# Patient Record
Sex: FEMALE | Race: WHITE | NOT HISPANIC OR LATINO
[De-identification: names, ages, dates, MRNs, and addresses within clinical notes are randomized per-mention and may not be internally consistent; named-entity substitution may affect disease eponyms.]

---

## 2023-01-24 ENCOUNTER — APPOINTMENT (OUTPATIENT)
Dept: ANTEPARTUM | Facility: CLINIC | Age: 33
End: 2023-01-24
Payer: MEDICAID

## 2023-01-24 ENCOUNTER — ASOB RESULT (OUTPATIENT)
Age: 33
End: 2023-01-24

## 2023-01-24 PROCEDURE — 76811 OB US DETAILED SNGL FETUS: CPT

## 2023-04-19 ENCOUNTER — APPOINTMENT (OUTPATIENT)
Dept: ANTEPARTUM | Facility: CLINIC | Age: 33
End: 2023-04-19
Payer: MEDICAID

## 2023-04-19 ENCOUNTER — ASOB RESULT (OUTPATIENT)
Age: 33
End: 2023-04-19

## 2023-04-19 PROCEDURE — 76816 OB US FOLLOW-UP PER FETUS: CPT

## 2023-04-19 PROCEDURE — 76819 FETAL BIOPHYS PROFIL W/O NST: CPT | Mod: 59

## 2023-04-19 PROCEDURE — 99203 OFFICE O/P NEW LOW 30 MIN: CPT | Mod: 25

## 2023-04-25 ENCOUNTER — APPOINTMENT (OUTPATIENT)
Dept: MATERNAL FETAL MEDICINE | Facility: CLINIC | Age: 33
End: 2023-04-25

## 2023-05-10 ENCOUNTER — ASOB RESULT (OUTPATIENT)
Age: 33
End: 2023-05-10

## 2023-05-10 ENCOUNTER — APPOINTMENT (OUTPATIENT)
Dept: ANTEPARTUM | Facility: CLINIC | Age: 33
End: 2023-05-10
Payer: MEDICAID

## 2023-05-10 PROCEDURE — 76819 FETAL BIOPHYS PROFIL W/O NST: CPT | Mod: 59

## 2023-05-10 PROCEDURE — 99212 OFFICE O/P EST SF 10 MIN: CPT | Mod: 25

## 2023-05-10 PROCEDURE — 76816 OB US FOLLOW-UP PER FETUS: CPT

## 2023-05-24 ENCOUNTER — ASOB RESULT (OUTPATIENT)
Age: 33
End: 2023-05-24

## 2023-05-24 ENCOUNTER — APPOINTMENT (OUTPATIENT)
Dept: ANTEPARTUM | Facility: CLINIC | Age: 33
End: 2023-05-24
Payer: MEDICAID

## 2023-05-24 PROCEDURE — 76816 OB US FOLLOW-UP PER FETUS: CPT

## 2023-05-24 PROCEDURE — 76819 FETAL BIOPHYS PROFIL W/O NST: CPT | Mod: 59

## 2023-05-30 ENCOUNTER — OUTPATIENT (OUTPATIENT)
Dept: OUTPATIENT SERVICES | Facility: HOSPITAL | Age: 33
LOS: 1 days | End: 2023-05-30
Payer: MEDICAID

## 2023-05-30 VITALS
OXYGEN SATURATION: 98 % | WEIGHT: 136.91 LBS | DIASTOLIC BLOOD PRESSURE: 61 MMHG | RESPIRATION RATE: 16 BRPM | HEART RATE: 92 BPM | HEIGHT: 56 IN | TEMPERATURE: 98 F | SYSTOLIC BLOOD PRESSURE: 121 MMHG

## 2023-05-30 DIAGNOSIS — K21.9 GASTRO-ESOPHAGEAL REFLUX DISEASE WITHOUT ESOPHAGITIS: ICD-10-CM

## 2023-05-30 DIAGNOSIS — Z64.1 PROBLEMS RELATED TO MULTIPARITY: ICD-10-CM

## 2023-05-30 DIAGNOSIS — Z98.891 HISTORY OF UTERINE SCAR FROM PREVIOUS SURGERY: Chronic | ICD-10-CM

## 2023-05-30 DIAGNOSIS — D64.9 ANEMIA, UNSPECIFIED: ICD-10-CM

## 2023-05-30 LAB
APPEARANCE UR: ABNORMAL
BILIRUB UR-MCNC: NEGATIVE — SIGNIFICANT CHANGE UP
COLOR SPEC: YELLOW — SIGNIFICANT CHANGE UP
DIFF PNL FLD: NEGATIVE — SIGNIFICANT CHANGE UP
GLUCOSE UR QL: NEGATIVE — SIGNIFICANT CHANGE UP
HCT VFR BLD CALC: 36.4 % — SIGNIFICANT CHANGE UP (ref 34.5–45)
HGB BLD-MCNC: 12 G/DL — SIGNIFICANT CHANGE UP (ref 11.5–15.5)
KETONES UR-MCNC: ABNORMAL
LEUKOCYTE ESTERASE UR-ACNC: ABNORMAL
MCHC RBC-ENTMCNC: 26.9 PG — LOW (ref 27–34)
MCHC RBC-ENTMCNC: 33 GM/DL — SIGNIFICANT CHANGE UP (ref 32–36)
MCV RBC AUTO: 81.6 FL — SIGNIFICANT CHANGE UP (ref 80–100)
NITRITE UR-MCNC: NEGATIVE — SIGNIFICANT CHANGE UP
NRBC # BLD: 0 /100 WBCS — SIGNIFICANT CHANGE UP (ref 0–0)
NRBC # FLD: 0 K/UL — SIGNIFICANT CHANGE UP (ref 0–0)
PH UR: 6.5 — SIGNIFICANT CHANGE UP (ref 5–8)
PLATELET # BLD AUTO: 229 K/UL — SIGNIFICANT CHANGE UP (ref 150–400)
PROT UR-MCNC: ABNORMAL
RBC # BLD: 4.46 M/UL — SIGNIFICANT CHANGE UP (ref 3.8–5.2)
RBC # FLD: 16.2 % — HIGH (ref 10.3–14.5)
SP GR SPEC: 1.02 — SIGNIFICANT CHANGE UP (ref 1.01–1.05)
UROBILINOGEN FLD QL: ABNORMAL
WBC # BLD: 10.32 K/UL — SIGNIFICANT CHANGE UP (ref 3.8–10.5)
WBC # FLD AUTO: 10.32 K/UL — SIGNIFICANT CHANGE UP (ref 3.8–10.5)

## 2023-05-30 PROCEDURE — 86077 PHYS BLOOD BANK SERV XMATCH: CPT

## 2023-05-30 NOTE — OB PST NOTE - HISTORY OF PRESENT ILLNESS
This is a  32 y.o. female who appears pregnant , in no apparent distress. Scheduled  worksheet provided and signed by Dr Hazel is on the chart . Pt with problems related to multiparity , scheduled for repeat  section , with bilateral tubal sterilization . , pt offers no complaints in pst , reports good fetal movement today . Last sono 1 week ago .

## 2023-05-30 NOTE — OB PST NOTE - NSHPPHYSICALEXAM_GEN_ALL_CORE
Pst  Constitutional: Well Developed, Well Groomed, Well Nourished, No Distress    Eyes: PERRL, EOMI, conjunctiva clear    Ears: Normal    Mouth & Gums: Normal, moist    Pharynx: No tenderness, discharge, or peritonsillar abscess    Tonsils: No Redness, discharge, tenderness, or swelling    Neck: Supple, no JVD, normal thyroid glands, no carotid bruits, no cervical vertebral or paraspinal tenderness    Breast: Normal shape, no masses, no tenderness, nipples normal, no nipple discharge    Back: Normal shape, ROM intact, strength intact, no vertebral tenderness    Respiratory: Airway patent, breath sounds equal, good air movement, respiration non-labored, clear to auscultation bilateral, no chest wall tenderness, no intercostal retractions, no rales, no wheezes, no rhonchi, no subcutaneous emphysema    Cardiovascular:  Regular rate and rhythm, no rubs or murmur, normal PMI    Gastrointestinal: Soft, non-tender, non distention, no masses palpable, bowel sound normal, no bruit, no rebound tenderness    Extremities: No clubbing, cyanosis, or pedal edema    Vascular:  Carotid Pulse normal , Radial Pulse normal, Femoral Pulse normal, DP pulse normal, PT pulse normal    Neurological: alert & oriented x 3, sensation intact, deep reflexes intact, cranial nerve intact, normal strength    Skin: warm and dry, normal color    Lymph Nodes: normal posterior cervical lymph node, normal anterior cervical lymph node, normal supraclavicular lymph node    Musculoskeletal: ROM intact, no joint swelling, warmth, or calf tenderness. Normal strength    Psychiatric: normal affect, normal behavior

## 2023-05-30 NOTE — OB PST NOTE - PROBLEM SELECTOR PLAN 2
pt is npo from 12 midnight the night before surgery . Pt instructed to speak with Dr Ojeda regarding pepcid day of surgery .

## 2023-05-30 NOTE — OB PST NOTE - NSHPREVIEWOFSYSTEMS_GEN_ALL_CORE
Pst  General: No fever, chills, sweating, anorexia, weight loss or weight gain. No polyphagia, polyuria, polydipsia, malaise, or fatigue    Skin: No rashes, itching, or dryness. No change in size/color of moles. No tumors, brittle nails, pitted nails, or hair loss    Breast: No tenderness, lumps, or nipple discharge      Ophthalmologic: No diplopia, photophobia, lacrimation, blurred Vision , or eye discharge    ENMT Symptoms: No hearing difficulty, ear pain, tinnitus, or vertigo. No sinus symptoms, nasal congestion, nasal   discharge, or nasal obstruction    Respiratory and Thorax: dry cough , no fever , no chills , No wheezing, dyspnea, hemoptysis, or pleuritic chest Pain     Cardiovascular: No chest pain, palpitations, dyspnea on exertion, orthopnea, paroxysmal nocturnal dyspnea,   peripheral edema, or claudication    Gastrointestinal: Intermittent vomiting related to GERD , last 5/28/23 . No nausea, diarrhea, constipation, change in bowel habits, flatulence, abdominal pain, or melena    Genitourinary/ Pelvis: No hematuria, renal colic, or flank pain.  No urine discoloration, incontinence, dysuria, or urinary hesitancy. Normal urinary frequency. No nocturia, abnormal vaginal bleeding, vaginal discharge, spotting, pelvic pain, or vaginal leakage    Musculoskeletal: No arthralgia, arthritis, joint swelling, muscle cramping, muscle weakness, neck pain, arm pain, or leg pain    Neurological: No transient paralysis, weakness, paresthesias, or seizures. No syncope, tremors, vertigo, loss of sensation, difficulty walking, loss of consciousness, hemiparesis, confusion, or facial palsy    Psychiatric: No suicidal ideation, depression, anxiety, insomnia, memory loss, paranoia, mood swings, agitation, hallucinations, or hyperactivity    Hematology: No gum bleeding, nose bleeding, or skin lumps    Lymphatic: No enlarged or tender lymph nodes. No extremity swelling    Endocrine: No heat or cold intolerance    Immunologic: No recurrent or persistent infections

## 2023-05-30 NOTE — OB PST NOTE - PROBLEM SELECTOR PLAN 1
Scheduled for repeat  section with bilateral tubal sterilization   Preop instructions provided and patient verbalizes understanding.  Labs done and results pending. Hibiclens provided with instructions and was signed by patient. Teach-back method was utilized to assess patient's understanding. Patient verbalized understanding.

## 2023-05-31 ENCOUNTER — ASOB RESULT (OUTPATIENT)
Age: 33
End: 2023-05-31

## 2023-05-31 ENCOUNTER — APPOINTMENT (OUTPATIENT)
Dept: ANTEPARTUM | Facility: CLINIC | Age: 33
End: 2023-05-31
Payer: MEDICAID

## 2023-05-31 PROCEDURE — 76818 FETAL BIOPHYS PROFILE W/NST: CPT

## 2023-06-05 ENCOUNTER — TRANSCRIPTION ENCOUNTER (OUTPATIENT)
Age: 33
End: 2023-06-05

## 2023-06-06 ENCOUNTER — INPATIENT (INPATIENT)
Facility: HOSPITAL | Age: 33
LOS: 2 days | Discharge: HOME | End: 2023-06-09
Attending: SPECIALIST | Admitting: SPECIALIST
Payer: MEDICAID

## 2023-06-06 VITALS — SYSTOLIC BLOOD PRESSURE: 105 MMHG | HEART RATE: 80 BPM | DIASTOLIC BLOOD PRESSURE: 72 MMHG

## 2023-06-06 DIAGNOSIS — Z98.891 HISTORY OF UTERINE SCAR FROM PREVIOUS SURGERY: Chronic | ICD-10-CM

## 2023-06-06 DIAGNOSIS — Z64.1 PROBLEMS RELATED TO MULTIPARITY: ICD-10-CM

## 2023-06-06 LAB
BLD GP AB SCN SERPL QL: POSITIVE — SIGNIFICANT CHANGE UP
HBV SURFACE AG SERPL QL IA: SIGNIFICANT CHANGE UP
HCT VFR BLD CALC: 35.2 % — SIGNIFICANT CHANGE UP (ref 34.5–45)
HCT VFR BLD CALC: 38.4 % — SIGNIFICANT CHANGE UP (ref 34.5–45)
HGB BLD-MCNC: 11.6 G/DL — SIGNIFICANT CHANGE UP (ref 11.5–15.5)
HGB BLD-MCNC: 12.6 G/DL — SIGNIFICANT CHANGE UP (ref 11.5–15.5)
HIV 1+2 AB+HIV1 P24 AG SERPL QL IA: SIGNIFICANT CHANGE UP
MCHC RBC-ENTMCNC: 27.5 PG — SIGNIFICANT CHANGE UP (ref 27–34)
MCHC RBC-ENTMCNC: 27.9 PG — SIGNIFICANT CHANGE UP (ref 27–34)
MCHC RBC-ENTMCNC: 32.8 GM/DL — SIGNIFICANT CHANGE UP (ref 32–36)
MCHC RBC-ENTMCNC: 33 GM/DL — SIGNIFICANT CHANGE UP (ref 32–36)
MCV RBC AUTO: 83.4 FL — SIGNIFICANT CHANGE UP (ref 80–100)
MCV RBC AUTO: 85.1 FL — SIGNIFICANT CHANGE UP (ref 80–100)
NRBC # BLD: 0 /100 WBCS — SIGNIFICANT CHANGE UP (ref 0–0)
NRBC # BLD: 0 /100 WBCS — SIGNIFICANT CHANGE UP (ref 0–0)
NRBC # FLD: 0 K/UL — SIGNIFICANT CHANGE UP (ref 0–0)
NRBC # FLD: 0 K/UL — SIGNIFICANT CHANGE UP (ref 0–0)
PLATELET # BLD AUTO: 209 K/UL — SIGNIFICANT CHANGE UP (ref 150–400)
PLATELET # BLD AUTO: 217 K/UL — SIGNIFICANT CHANGE UP (ref 150–400)
RBC # BLD: 4.22 M/UL — SIGNIFICANT CHANGE UP (ref 3.8–5.2)
RBC # BLD: 4.51 M/UL — SIGNIFICANT CHANGE UP (ref 3.8–5.2)
RBC # FLD: 16.4 % — HIGH (ref 10.3–14.5)
RBC # FLD: 16.6 % — HIGH (ref 10.3–14.5)
RH IG SCN BLD-IMP: NEGATIVE — SIGNIFICANT CHANGE UP
RUBV IGG SER-ACNC: 3.9 INDEX — SIGNIFICANT CHANGE UP
RUBV IGG SER-IMP: POSITIVE — SIGNIFICANT CHANGE UP
T PALLIDUM AB TITR SER: NEGATIVE — SIGNIFICANT CHANGE UP
WBC # BLD: 18.58 K/UL — HIGH (ref 3.8–10.5)
WBC # BLD: 9.23 K/UL — SIGNIFICANT CHANGE UP (ref 3.8–10.5)
WBC # FLD AUTO: 18.58 K/UL — HIGH (ref 3.8–10.5)
WBC # FLD AUTO: 9.23 K/UL — SIGNIFICANT CHANGE UP (ref 3.8–10.5)

## 2023-06-06 PROCEDURE — 88302 TISSUE EXAM BY PATHOLOGIST: CPT | Mod: 26

## 2023-06-06 PROCEDURE — 86077 PHYS BLOOD BANK SERV XMATCH: CPT

## 2023-06-06 DEVICE — SEALANT VISTASEAL FIBRIN HUMAN 10ML 4/KIT: Type: IMPLANTABLE DEVICE | Status: FUNCTIONAL

## 2023-06-06 DEVICE — SURGICEL FIBRILLAR 1 X 2": Type: IMPLANTABLE DEVICE | Status: FUNCTIONAL

## 2023-06-06 RX ORDER — KETOROLAC TROMETHAMINE 30 MG/ML
30 SYRINGE (ML) INJECTION EVERY 6 HOURS
Refills: 0 | Status: DISCONTINUED | OUTPATIENT
Start: 2023-06-06 | End: 2023-06-07

## 2023-06-06 RX ORDER — FAMOTIDINE 10 MG/ML
20 INJECTION INTRAVENOUS ONCE
Refills: 0 | Status: COMPLETED | OUTPATIENT
Start: 2023-06-06 | End: 2023-06-06

## 2023-06-06 RX ORDER — DEXAMETHASONE 0.5 MG/5ML
4 ELIXIR ORAL EVERY 6 HOURS
Refills: 0 | Status: DISCONTINUED | OUTPATIENT
Start: 2023-06-06 | End: 2023-06-09

## 2023-06-06 RX ORDER — SODIUM CHLORIDE 9 MG/ML
1000 INJECTION, SOLUTION INTRAVENOUS
Refills: 0 | Status: DISCONTINUED | OUTPATIENT
Start: 2023-06-06 | End: 2023-06-06

## 2023-06-06 RX ORDER — SODIUM CHLORIDE 9 MG/ML
1000 INJECTION, SOLUTION INTRAVENOUS
Refills: 0 | Status: DISCONTINUED | OUTPATIENT
Start: 2023-06-06 | End: 2023-06-09

## 2023-06-06 RX ORDER — OXYTOCIN 10 UNIT/ML
333.33 VIAL (ML) INJECTION
Qty: 20 | Refills: 0 | Status: DISCONTINUED | OUTPATIENT
Start: 2023-06-06 | End: 2023-06-09

## 2023-06-06 RX ORDER — ONDANSETRON 8 MG/1
4 TABLET, FILM COATED ORAL EVERY 6 HOURS
Refills: 0 | Status: DISCONTINUED | OUTPATIENT
Start: 2023-06-06 | End: 2023-06-09

## 2023-06-06 RX ORDER — OXYTOCIN 10 UNIT/ML
333.33 VIAL (ML) INJECTION
Qty: 20 | Refills: 0 | Status: DISCONTINUED | OUTPATIENT
Start: 2023-06-06 | End: 2023-06-06

## 2023-06-06 RX ORDER — SODIUM CHLORIDE 9 MG/ML
500 INJECTION, SOLUTION INTRAVENOUS ONCE
Refills: 0 | Status: DISCONTINUED | OUTPATIENT
Start: 2023-06-06 | End: 2023-06-09

## 2023-06-06 RX ORDER — OXYCODONE HYDROCHLORIDE 5 MG/1
5 TABLET ORAL
Refills: 0 | Status: COMPLETED | OUTPATIENT
Start: 2023-06-06 | End: 2023-06-13

## 2023-06-06 RX ORDER — SODIUM CHLORIDE 9 MG/ML
1000 INJECTION, SOLUTION INTRAVENOUS ONCE
Refills: 0 | Status: DISCONTINUED | OUTPATIENT
Start: 2023-06-06 | End: 2023-06-09

## 2023-06-06 RX ORDER — IBUPROFEN 200 MG
600 TABLET ORAL EVERY 6 HOURS
Refills: 0 | Status: COMPLETED | OUTPATIENT
Start: 2023-06-06 | End: 2024-05-04

## 2023-06-06 RX ORDER — NALOXONE HYDROCHLORIDE 4 MG/.1ML
0.1 SPRAY NASAL
Refills: 0 | Status: DISCONTINUED | OUTPATIENT
Start: 2023-06-06 | End: 2023-06-09

## 2023-06-06 RX ORDER — CITRIC ACID/SODIUM CITRATE 300-500 MG
30 SOLUTION, ORAL ORAL ONCE
Refills: 0 | Status: COMPLETED | OUTPATIENT
Start: 2023-06-06 | End: 2023-06-06

## 2023-06-06 RX ORDER — NALBUPHINE HYDROCHLORIDE 10 MG/ML
2.5 INJECTION, SOLUTION INTRAMUSCULAR; INTRAVENOUS; SUBCUTANEOUS EVERY 6 HOURS
Refills: 0 | Status: DISCONTINUED | OUTPATIENT
Start: 2023-06-06 | End: 2023-06-09

## 2023-06-06 RX ORDER — OXYCODONE HYDROCHLORIDE 5 MG/1
5 TABLET ORAL ONCE
Refills: 0 | Status: DISCONTINUED | OUTPATIENT
Start: 2023-06-06 | End: 2023-06-09

## 2023-06-06 RX ORDER — DIPHENHYDRAMINE HCL 50 MG
25 CAPSULE ORAL EVERY 6 HOURS
Refills: 0 | Status: DISCONTINUED | OUTPATIENT
Start: 2023-06-06 | End: 2023-06-09

## 2023-06-06 RX ORDER — SIMETHICONE 80 MG/1
80 TABLET, CHEWABLE ORAL EVERY 4 HOURS
Refills: 0 | Status: DISCONTINUED | OUTPATIENT
Start: 2023-06-06 | End: 2023-06-09

## 2023-06-06 RX ORDER — HEPARIN SODIUM 5000 [USP'U]/ML
5000 INJECTION INTRAVENOUS; SUBCUTANEOUS EVERY 12 HOURS
Refills: 0 | Status: DISCONTINUED | OUTPATIENT
Start: 2023-06-06 | End: 2023-06-09

## 2023-06-06 RX ORDER — SODIUM CHLORIDE 9 MG/ML
1000 INJECTION, SOLUTION INTRAVENOUS ONCE
Refills: 0 | Status: COMPLETED | OUTPATIENT
Start: 2023-06-06 | End: 2023-06-06

## 2023-06-06 RX ORDER — LANOLIN
1 OINTMENT (GRAM) TOPICAL EVERY 6 HOURS
Refills: 0 | Status: DISCONTINUED | OUTPATIENT
Start: 2023-06-06 | End: 2023-06-09

## 2023-06-06 RX ORDER — SODIUM CHLORIDE 9 MG/ML
1000 INJECTION, SOLUTION INTRAVENOUS ONCE
Refills: 0 | Status: DISCONTINUED | OUTPATIENT
Start: 2023-06-06 | End: 2023-06-06

## 2023-06-06 RX ORDER — ACETAMINOPHEN 500 MG
975 TABLET ORAL
Refills: 0 | Status: DISCONTINUED | OUTPATIENT
Start: 2023-06-06 | End: 2023-06-09

## 2023-06-06 RX ORDER — TETANUS TOXOID, REDUCED DIPHTHERIA TOXOID AND ACELLULAR PERTUSSIS VACCINE, ADSORBED 5; 2.5; 8; 8; 2.5 [IU]/.5ML; [IU]/.5ML; UG/.5ML; UG/.5ML; UG/.5ML
0.5 SUSPENSION INTRAMUSCULAR ONCE
Refills: 0 | Status: DISCONTINUED | OUTPATIENT
Start: 2023-06-06 | End: 2023-06-09

## 2023-06-06 RX ORDER — MAGNESIUM HYDROXIDE 400 MG/1
30 TABLET, CHEWABLE ORAL
Refills: 0 | Status: DISCONTINUED | OUTPATIENT
Start: 2023-06-06 | End: 2023-06-09

## 2023-06-06 RX ORDER — MORPHINE SULFATE 50 MG/1
2 CAPSULE, EXTENDED RELEASE ORAL ONCE
Refills: 0 | Status: DISCONTINUED | OUTPATIENT
Start: 2023-06-06 | End: 2023-06-09

## 2023-06-06 RX ADMIN — Medication 975 MILLIGRAM(S): at 21:17

## 2023-06-06 RX ADMIN — SIMETHICONE 80 MILLIGRAM(S): 80 TABLET, CHEWABLE ORAL at 21:18

## 2023-06-06 RX ADMIN — Medication 30 MILLIGRAM(S): at 18:04

## 2023-06-06 RX ADMIN — Medication 1000 MILLIUNIT(S)/MIN: at 12:37

## 2023-06-06 RX ADMIN — HEPARIN SODIUM 5000 UNIT(S): 5000 INJECTION INTRAVENOUS; SUBCUTANEOUS at 16:52

## 2023-06-06 RX ADMIN — Medication 975 MILLIGRAM(S): at 16:45

## 2023-06-06 RX ADMIN — SODIUM CHLORIDE 200 MILLILITER(S): 9 INJECTION, SOLUTION INTRAVENOUS at 07:56

## 2023-06-06 RX ADMIN — Medication 30 MILLIGRAM(S): at 23:37

## 2023-06-06 RX ADMIN — Medication 30 MILLILITER(S): at 07:57

## 2023-06-06 RX ADMIN — Medication 30 MILLIGRAM(S): at 17:24

## 2023-06-06 RX ADMIN — SODIUM CHLORIDE 75 MILLILITER(S): 9 INJECTION, SOLUTION INTRAVENOUS at 12:37

## 2023-06-06 RX ADMIN — SODIUM CHLORIDE 1000 MILLILITER(S): 9 INJECTION, SOLUTION INTRAVENOUS at 11:20

## 2023-06-06 RX ADMIN — Medication 975 MILLIGRAM(S): at 22:00

## 2023-06-06 RX ADMIN — Medication 975 MILLIGRAM(S): at 15:26

## 2023-06-06 RX ADMIN — FAMOTIDINE 20 MILLIGRAM(S): 10 INJECTION INTRAVENOUS at 07:56

## 2023-06-06 NOTE — OB NEONATOLOGY/PEDIATRICIAN DELIVERY SUMMARY - NSPEDSNEONOTESA_OBGYN_ALL_OB_FT
Peds called to the delivery of a 39.0 wk male for vacuum. Baby born to a 33 y/o  mother via vacuum-assisted repeat CS. Maternal history significant for prior C/S, anxiety without meds. Pregnancy uncomplicated. Maternal blood type O neg. Prenatal labs negative, non-reactive and immune. GBS negative on . AROM at TOD, clear. Baby was born vigorous and crying spontaneously. W/D/S/S. APGARS 9/9. Highest maternal temp 36.6C; EOS: N/A. Mom would like to breastfeed, declines Hep B vaccine and circ.    Gen: NAD; well-appearing, significant vernix  HEENT: NC/AT; AFOF; ears and nose clinically patent, normally set; no tags; no cleft lip/palate, oropharynx clear, mucous membranes moist  Skin: pink, warm, well-perfused, no rash  Resp: CTAB, even, non-labored breathing  Cardiac: umbilical pulse regular  Abd: soft, NT/ND; +BS; no HSM, no masses palpated; umbilicus c/d/I, 3 vessels  Back: spine straight, sacral dimple with base  Extremities: FROM; no crepitus; negative O/B  : Usman I; no abnormalities; testes descended B/L; anus appears patent  Neuro: normal tone; +[place], suck, grasp, Babinski Peds called to the delivery of a 39.0 wk male for vacuum. Baby born to a 33 y/o  mother via vacuum-assisted repeat CS. Maternal history significant for prior C/S, anxiety without meds. Pregnancy uncomplicated. Maternal blood type O neg, rec'd rhogam at 28 weeks. Prenatal labs HIV negative, other labs sent and pending. GBS negative on . AROM at TOD, clear. Baby was born vigorous and crying spontaneously. W/D/S/S. APGARS 9/9. Highest maternal temp 36.6C; EOS: N/A. Mom would like to breastfeed, declines Hep B vaccine and circ.    Gen: NAD; well-appearing, significant vernix  HEENT: NC/AT; AFOF; ears and nose clinically patent, normally set; no tags; no cleft lip/palate, oropharynx clear, mucous membranes moist  Skin: pink, warm, well-perfused, no rash  Resp: CTAB, even, non-labored breathing  Cardiac: umbilical pulse regular  Abd: soft, NT/ND; +BS; no HSM, no masses palpated; umbilicus c/d/I, 3 vessels  Back: spine straight, sacral dimple with base  Extremities: FROM; no crepitus; negative O/B  : Usman I; no abnormalities; testes descended B/L; anus appears patent  Neuro: normal tone; +Mini, suck, grasp, Babinski

## 2023-06-06 NOTE — PROCEDURAL SAFETY CHECKLIST WITH OR WITHOUT SEDATION - NSPREPROCFT_GEN_ALL_CORE
repeat c/s, bilateral salpingectomy, possible bilateral tubal ligation, combined spinal epidural anesthesia

## 2023-06-06 NOTE — OB RN INTRAOPERATIVE NOTE - NSRNPREP_OBGYN_ALL_OB
Asc Procedure Text (A): After obtaining clear surgical margins the patient was sent to an ASC for surgical repair.  The patient understands they will receive post-surgical care and follow-up from the ASC physician. Yes

## 2023-06-06 NOTE — OB PROVIDER DELIVERY SUMMARY - NSSELHIDDEN_OBGYN_ALL_OB_FT
[NS_DeliveryAttending1_OBGYN_ALL_OB_FT:[id number]],[NS_DeliveryAssist1_OBGYN_ALL_OB_FT:[id number]],[NS_DeliveryAssist2_OBGYN_ALL_OB_FT:[id number]],[NS_DeliveryRN_OBGYN_ALL_OB_FT:[id number]]

## 2023-06-06 NOTE — OB PROVIDER H&P - NSHPPHYSICALEXAM_GEN_ALL_CORE
Vital Signs:  T(C): 36.6 (06 Jun 2023 06:33), Max: 36.6 (06 Jun 2023 06:33)  T(F): 97.88 (06 Jun 2023 06:33), Max: 97.88 (06 Jun 2023 06:33)  HR: 80 (06 Jun 2023 06:32) (80 - 80)  BP: 105/72 (06 Jun 2023 06:32) (105/72 - 105/72)  RR: 14 (06 Jun 2023 06:33) (14 - 14)  General: A&O x3  Heart: RRR, S1 & S2  Lungs: CTA b/l, good inspiratory/expiratory effort. No rhonchi, no rales  Abdomen: Soft, Gravid, TOCO in place, **+pfannenstial scar  Extremities: FROM, bilateral 1+ LE edema  Neuro: grossly intact

## 2023-06-06 NOTE — OB PROVIDER DELIVERY SUMMARY - NSOBVTERISKREFER_OBGYN_ALL_OB
It was a pleasure to see you today.     For the findings in your lungs we will do a bronchoscopy to get better samples. We will evaluate for inflammation and infections.    Please schedule lung function testing.    I will see you back with all the results        Fern Álvarez MD  Pulmonary and Critical Care Medicine  [facility]  Office: [phone]      
Refer to the Assessment tab to view/cancel completed assessment.

## 2023-06-06 NOTE — OB RN PATIENT PROFILE - ALERT: PERTINENT HISTORY
Patient desires tubal ligation/1st Trimester Sonogram/20 Week Level II Sonogram/Non Invasive Prenatal Screen (NIPS)/Fetal Non-Stress Test (NST)/Ultra Screen at 12 Weeks

## 2023-06-06 NOTE — OB RN DELIVERY SUMMARY - NSSELHIDDEN_OBGYN_ALL_OB_FT
[NS_DeliveryAttending1_OBGYN_ALL_OB_FT:[id number]],[NS_DeliveryAssist1_OBGYN_ALL_OB_FT:[email or web address]],[NS_DeliveryAssist2_OBGYN_ALL_OB_FT:[id number]],[NS_DeliveryRN_OBGYN_ALL_OB_FT:[id number]]

## 2023-06-06 NOTE — OB PROVIDER DELIVERY SUMMARY - NSPROVIDERDELIVERYNOTE_OBGYN_ALL_OB_FT
Repeat  section  Classical incision made 2/2 dense adhesions of rectus to uterine wall and limited visualization. Viable male infant, vertex presentation. Vacuum assisted delivery. Following delivery, uterus able to exteriorized. Hysterotomy closed in multiple layer. Bilateral salpingectomy performed. Vistaseal and fibrillar placed over hysterotomy. Grossly normal fallopian tubes, uterus, and ovaries    Antibiotics: Ancef    QBL: 1433   IVF: 2000  UOP:200    Bin PGY4  w/ Dr. Acuna NP    Dictation#[id number]

## 2023-06-06 NOTE — OB RN INTRAOPERATIVE NOTE - NSSELHIDDEN_OBGYN_ALL_OB_FT
[NS_DeliveryAttending1_OBGYN_ALL_OB_FT:[id number]],[NS_DeliveryAssist1_OBGYN_ALL_OB_FT:[email or web address]=],[NS_DeliveryAssist2_OBGYN_ALL_OB_FT:[id number]],[NS_DeliveryRN_OBGYN_ALL_OB_FT:[id number]]

## 2023-06-06 NOTE — OB RN INTRAOPERATIVE NOTE - NS_SKINPREP_OBGYN_ALL_OB
[Fever] : no fever [Chills] : no chills [Chest Pain] : no chest pain [Shortness Of Breath] : no shortness of breath [Abdominal Pain] : no abdominal pain Chloraprep [Vomiting] : no vomiting [Constipation] : no constipation [Diarrhea] : no diarrhea [Dizziness] : no dizziness

## 2023-06-06 NOTE — OB PROVIDER H&P - ASSESSMENT
A: 31 yo , EGA@39 weeks, scheduled for repeat  section and bilateral tubal ligation.  Plan: admit to PACU  - blood transfusion consent signed, operative consent is to be discussed and signed by Dr Maldonado.  - NPO  - admission labs sent  - NST  - 2 IV access, 2 Units PRBC on hold  - preop meds as per preop policy    Kami Powell CNM

## 2023-06-06 NOTE — OB PROVIDER H&P - HISTORY OF PRESENT ILLNESS
33 yo , EGA@39 weeks presented for scheduled repeat  section with bilateral tubal ligation.  Patient denies contractions, leaking fluid, vaginal bleeding, reports + fetal movements.  Prenatal care with carson Maldonado.  Prenatal course is uncomplicated.  History of 4  sections.

## 2023-06-06 NOTE — OB RN PATIENT PROFILE - AS SC BRADEN FRICTION
Left message to call the office.
Patient called asking if she could get an order for covid antibody test . Patient states that she just had a negative covid test last Friday done at a [facility] in [place] but she had been with her mother had tested positive for covid and she had had previous symptoms herself of fever , slight cough and mild diarrhea . Patient would like to know if she has had covid or not. Okay to order ?
Pt notified ok to order antibody test.  She will have done at [facility].
(3) no apparent problem

## 2023-06-06 NOTE — OB RN PATIENT PROFILE - NS_OBGYNHISTORY_OBGYN_ALL_OB_FT
2008 c/c [facility] F5#6 Post partum depression  2009 c/s F 6#15  2012 c/s F 6#13  2016 c/s F 6#14

## 2023-06-06 NOTE — OB PROVIDER H&P - NS_OBGYNHISTORY_OBGYN_ALL_OB_FT
OB hx: 3/29/2008,  section, full term, uncomplicated, [id number]           9/15/2009,  section, full term, uncomplicated, [id number]           2009,  section, full term, uncomplicated,[id number]           2016,  section, full term, uncomplicated, [id number]  GYN hx: denies hx of abnormal Papsmear/cysts/fibroids/STDs

## 2023-06-07 ENCOUNTER — APPOINTMENT (OUTPATIENT)
Dept: ANTEPARTUM | Facility: CLINIC | Age: 33
End: 2023-06-07

## 2023-06-07 LAB
BASOPHILS # BLD AUTO: 0.06 K/UL — SIGNIFICANT CHANGE UP (ref 0–0.2)
BASOPHILS NFR BLD AUTO: 0.4 % — SIGNIFICANT CHANGE UP (ref 0–2)
EOSINOPHIL # BLD AUTO: 0.05 K/UL — SIGNIFICANT CHANGE UP (ref 0–0.5)
EOSINOPHIL NFR BLD AUTO: 0.3 % — SIGNIFICANT CHANGE UP (ref 0–6)
HCT VFR BLD CALC: 27.2 % — LOW (ref 34.5–45)
HGB BLD-MCNC: 8.8 G/DL — LOW (ref 11.5–15.5)
IANC: 10.88 K/UL — HIGH (ref 1.8–7.4)
IMM GRANULOCYTES NFR BLD AUTO: 0.4 % — SIGNIFICANT CHANGE UP (ref 0–0.9)
KLEIHAUER-BETKE CALCULATION: 0 % — SIGNIFICANT CHANGE UP
LYMPHOCYTES # BLD AUTO: 15 % — SIGNIFICANT CHANGE UP (ref 13–44)
LYMPHOCYTES # BLD AUTO: 2.19 K/UL — SIGNIFICANT CHANGE UP (ref 1–3.3)
MCHC RBC-ENTMCNC: 27.9 PG — SIGNIFICANT CHANGE UP (ref 27–34)
MCHC RBC-ENTMCNC: 32.4 GM/DL — SIGNIFICANT CHANGE UP (ref 32–36)
MCV RBC AUTO: 86.3 FL — SIGNIFICANT CHANGE UP (ref 80–100)
MONOCYTES # BLD AUTO: 1.32 K/UL — HIGH (ref 0–0.9)
MONOCYTES NFR BLD AUTO: 9.1 % — SIGNIFICANT CHANGE UP (ref 2–14)
NEUTROPHILS # BLD AUTO: 10.88 K/UL — HIGH (ref 1.8–7.4)
NEUTROPHILS NFR BLD AUTO: 74.8 % — SIGNIFICANT CHANGE UP (ref 43–77)
NRBC # BLD: 0 /100 WBCS — SIGNIFICANT CHANGE UP (ref 0–0)
NRBC # FLD: 0 K/UL — SIGNIFICANT CHANGE UP (ref 0–0)
PLATELET # BLD AUTO: 185 K/UL — SIGNIFICANT CHANGE UP (ref 150–400)
RBC # BLD: 3.15 M/UL — LOW (ref 3.8–5.2)
RBC # FLD: 16.8 % — HIGH (ref 10.3–14.5)
WBC # BLD: 14.56 K/UL — HIGH (ref 3.8–10.5)
WBC # FLD AUTO: 14.56 K/UL — HIGH (ref 3.8–10.5)

## 2023-06-07 RX ORDER — FERROUS SULFATE 325(65) MG
325 TABLET ORAL DAILY
Refills: 0 | Status: DISCONTINUED | OUTPATIENT
Start: 2023-06-07 | End: 2023-06-09

## 2023-06-07 RX ORDER — ASCORBIC ACID 60 MG
500 TABLET,CHEWABLE ORAL
Refills: 0 | Status: DISCONTINUED | OUTPATIENT
Start: 2023-06-07 | End: 2023-06-09

## 2023-06-07 RX ORDER — IBUPROFEN 200 MG
600 TABLET ORAL EVERY 6 HOURS
Refills: 0 | Status: DISCONTINUED | OUTPATIENT
Start: 2023-06-07 | End: 2023-06-09

## 2023-06-07 RX ORDER — OXYCODONE HYDROCHLORIDE 5 MG/1
5 TABLET ORAL
Refills: 0 | Status: DISCONTINUED | OUTPATIENT
Start: 2023-06-07 | End: 2023-06-09

## 2023-06-07 RX ORDER — POLYETHYLENE GLYCOL 3350 17 G/17G
17 POWDER, FOR SOLUTION ORAL DAILY
Refills: 0 | Status: DISCONTINUED | OUTPATIENT
Start: 2023-06-07 | End: 2023-06-09

## 2023-06-07 RX ORDER — SENNA PLUS 8.6 MG/1
2 TABLET ORAL EVERY 12 HOURS
Refills: 0 | Status: DISCONTINUED | OUTPATIENT
Start: 2023-06-07 | End: 2023-06-09

## 2023-06-07 RX ADMIN — OXYCODONE HYDROCHLORIDE 5 MILLIGRAM(S): 5 TABLET ORAL at 02:42

## 2023-06-07 RX ADMIN — Medication 600 MILLIGRAM(S): at 17:56

## 2023-06-07 RX ADMIN — Medication 975 MILLIGRAM(S): at 03:00

## 2023-06-07 RX ADMIN — Medication 30 MILLIGRAM(S): at 05:38

## 2023-06-07 RX ADMIN — Medication 500 MILLIGRAM(S): at 11:02

## 2023-06-07 RX ADMIN — Medication 975 MILLIGRAM(S): at 14:30

## 2023-06-07 RX ADMIN — Medication 600 MILLIGRAM(S): at 11:02

## 2023-06-07 RX ADMIN — Medication 975 MILLIGRAM(S): at 08:17

## 2023-06-07 RX ADMIN — OXYCODONE HYDROCHLORIDE 5 MILLIGRAM(S): 5 TABLET ORAL at 03:15

## 2023-06-07 RX ADMIN — Medication 600 MILLIGRAM(S): at 11:35

## 2023-06-07 RX ADMIN — Medication 30 MILLIGRAM(S): at 01:04

## 2023-06-07 RX ADMIN — HEPARIN SODIUM 5000 UNIT(S): 5000 INJECTION INTRAVENOUS; SUBCUTANEOUS at 05:38

## 2023-06-07 RX ADMIN — SIMETHICONE 80 MILLIGRAM(S): 80 TABLET, CHEWABLE ORAL at 02:42

## 2023-06-07 RX ADMIN — OXYCODONE HYDROCHLORIDE 5 MILLIGRAM(S): 5 TABLET ORAL at 23:51

## 2023-06-07 RX ADMIN — Medication 30 MILLIGRAM(S): at 06:44

## 2023-06-07 RX ADMIN — Medication 600 MILLIGRAM(S): at 23:52

## 2023-06-07 RX ADMIN — SENNA PLUS 2 TABLET(S): 8.6 TABLET ORAL at 11:16

## 2023-06-07 RX ADMIN — Medication 975 MILLIGRAM(S): at 22:15

## 2023-06-07 RX ADMIN — Medication 975 MILLIGRAM(S): at 21:19

## 2023-06-07 RX ADMIN — Medication 975 MILLIGRAM(S): at 13:58

## 2023-06-07 RX ADMIN — Medication 600 MILLIGRAM(S): at 17:26

## 2023-06-07 RX ADMIN — Medication 975 MILLIGRAM(S): at 02:24

## 2023-06-07 RX ADMIN — Medication 975 MILLIGRAM(S): at 08:56

## 2023-06-07 NOTE — PROGRESS NOTE ADULT - SUBJECTIVE AND OBJECTIVE BOX
OB Progress Note:  Delivery, POD#1    S: Her pain is well controlled. She is tolerating a regular diet and passing flatus. Denies N/V. Denies CP/SOB/lightheadedness/dizziness. Endorses light vaginal bleeding, less than one pad per hour. She is ambulating without difficulty. Voiding spontaneously.     O:   Vital Signs Last 24 Hrs  T(C): 36.6 (2023 06:31), Max: 37 (2023 18:50)  T(F): 97.9 (2023 06:31), Max: 98.6 (2023 18:50)  HR: 84 (2023 06:31) (67 - 95)  BP: 105/61 (2023 06:31) (98/58 - 120/71)  BP(mean): 73 (2023 17:00) (66 - 88)  RR: 17 (2023 06:31) (12 - 25)  SpO2: 99% (2023 06:31) (98% - 100%)    Parameters below as of 2023 06:31  Patient On (Oxygen Delivery Method): room air        PE:  General: NAD  Heart: extremities well-perfused  Lungs: breathing comfortably  Abdomen: Mildly distended, appropriately tender, incision c/d/i.  Extremities: No erythema, no pitting edema    Labs:  Blood type: O Negative  Rubella IgG: RPR: Negative                          8.8<L>   14.56<H> >-----------< 185    (  @ 05:40 )             27.2<L>                        11.6   18.58<H> >-----------< 217    (  @ 14:23 )             35.2                        12.6   9.23 >-----------< 209    (  @ 06:40 )             38.4

## 2023-06-07 NOTE — PROGRESS NOTE ADULT - SUBJECTIVE AND OBJECTIVE BOX
Postop Day  1  s/p   C- Section    THERAPY:  [ X] Epidural morphine         Sedation Score:	  [ X] Alert	    [  ] Drowsy        [  ] Arousable	[  ] Asleep	[  ] Unresponsive    Side Effects:	  [ X] None	     [  ] Nausea        [  ] Pruritus        [  ] Weakness   [  ] Numbness      Denies headache, able to ambulate, tolerating diet, voiding without issues. Her only complaint is right-sided rib pain. I reassured her that the symptom should subside; however, encouraged her to contact our team should symptom persist.     ASSESSMENT/ PLAN   Change to po prn pain meds 24 hours post Spinal Morphine.  [ x ]Documentation and Verification of current medications

## 2023-06-08 RX ORDER — IBUPROFEN 200 MG
1 TABLET ORAL
Qty: 0 | Refills: 0 | DISCHARGE
Start: 2023-06-08

## 2023-06-08 RX ORDER — ACETAMINOPHEN 500 MG
3 TABLET ORAL
Qty: 0 | Refills: 0 | DISCHARGE
Start: 2023-06-08

## 2023-06-08 RX ORDER — FAMOTIDINE 10 MG/ML
1 INJECTION INTRAVENOUS
Refills: 0 | DISCHARGE

## 2023-06-08 RX ADMIN — Medication 600 MILLIGRAM(S): at 17:26

## 2023-06-08 RX ADMIN — OXYCODONE HYDROCHLORIDE 5 MILLIGRAM(S): 5 TABLET ORAL at 00:50

## 2023-06-08 RX ADMIN — OXYCODONE HYDROCHLORIDE 5 MILLIGRAM(S): 5 TABLET ORAL at 16:53

## 2023-06-08 RX ADMIN — Medication 600 MILLIGRAM(S): at 18:00

## 2023-06-08 RX ADMIN — Medication 600 MILLIGRAM(S): at 12:14

## 2023-06-08 RX ADMIN — Medication 600 MILLIGRAM(S): at 07:00

## 2023-06-08 RX ADMIN — OXYCODONE HYDROCHLORIDE 5 MILLIGRAM(S): 5 TABLET ORAL at 06:06

## 2023-06-08 RX ADMIN — OXYCODONE HYDROCHLORIDE 5 MILLIGRAM(S): 5 TABLET ORAL at 07:00

## 2023-06-08 RX ADMIN — Medication 600 MILLIGRAM(S): at 06:07

## 2023-06-08 RX ADMIN — Medication 500 MILLIGRAM(S): at 06:07

## 2023-06-08 RX ADMIN — Medication 600 MILLIGRAM(S): at 11:23

## 2023-06-08 RX ADMIN — Medication 500 MILLIGRAM(S): at 17:26

## 2023-06-08 RX ADMIN — Medication 975 MILLIGRAM(S): at 03:43

## 2023-06-08 RX ADMIN — Medication 600 MILLIGRAM(S): at 00:50

## 2023-06-08 RX ADMIN — Medication 975 MILLIGRAM(S): at 22:31

## 2023-06-08 RX ADMIN — OXYCODONE HYDROCHLORIDE 5 MILLIGRAM(S): 5 TABLET ORAL at 00:25

## 2023-06-08 RX ADMIN — OXYCODONE HYDROCHLORIDE 5 MILLIGRAM(S): 5 TABLET ORAL at 16:23

## 2023-06-08 RX ADMIN — Medication 975 MILLIGRAM(S): at 04:40

## 2023-06-08 RX ADMIN — SENNA PLUS 2 TABLET(S): 8.6 TABLET ORAL at 06:06

## 2023-06-08 RX ADMIN — OXYCODONE HYDROCHLORIDE 5 MILLIGRAM(S): 5 TABLET ORAL at 23:26

## 2023-06-08 RX ADMIN — SIMETHICONE 80 MILLIGRAM(S): 80 TABLET, CHEWABLE ORAL at 06:06

## 2023-06-08 RX ADMIN — Medication 975 MILLIGRAM(S): at 23:30

## 2023-06-08 NOTE — DISCHARGE NOTE OB - PLAN OF CARE
REGULAR DIET  AMBULATION ENCOURAGED After discharge, please stay on pelvic rest for 6 weeks, meaning no sexual intercourse, no tampons and no douching.  No driving for 2 weeks as women can loose a lot of blood during delivery and there is a possibility of being lightheaded/fainting.  No lifting objects heavier than baby for two weeks.  Expect to have vaginal bleeding/spotting for up to six weeks.  The bleeding should get lighter and more white/light brown with time.  For bleeding soaking more than a pad an hour or passing clots greater than the size of your fist, come in to the emergency department.    Follow up in OB office in 1-2 weeks for incision check.  Call for noticeable increase in redness or swelling at incision, discharge from incision, or opening of skin at incision site

## 2023-06-08 NOTE — PROGRESS NOTE ADULT - SUBJECTIVE AND OBJECTIVE BOX
Postpartum Note,  Section  She is a  32y woman who is now post-operative day: 2    Subjective:  The patient feels well.  Mild incisional pain  Passed flatus  She is ambulating.   She is tolerating regular diet.  She denies nausea and vomiting.  She is voiding.  Her pain is controlled.  She reports normal postpartum bleeding    Physical exam:    Vital Signs Last 24 Hrs  T(C): 36.6 (2023 06:44), Max: 36.7 (2023 21:19)  T(F): 97.8 (2023 06:44), Max: 98.1 (2023 21:19)  HR: 99 (2023 06:44) (98 - 105)  BP: 104/57 (2023 06:44) (100/53 - 104/57)  BP(mean): --  RR: 17 (2023 06:44) (16 - 18)  SpO2: 100% (2023 06:44) (99% - 100%)    Parameters below as of 2023 06:44  Patient On (Oxygen Delivery Method): room air        Gen: NAD  Breast: Soft, nontender, not engorged.  Abdomen: Soft, nontender, no distension , firm uterine fundus at umbilicus.  Incision: Clean, dry, and intact with steri strips  Pelvic: Normal lochia noted  Ext: No calf tenderness    LABS:                        8.8    14.56 )-----------( 185      ( 2023 05:40 )             27.2                         11.6   18.58 )-----------( 217      ( 2023 14:23 )             35.2       Allergies    No Known Allergies    Intolerances      MEDICATIONS  (STANDING):  acetaminophen     Tablet .. 975 milliGRAM(s) Oral <User Schedule>  ascorbic acid 500 milliGRAM(s) Oral two times a day  diphtheria/tetanus/pertussis (acellular) Vaccine (Adacel) 0.5 milliLiter(s) IntraMuscular once  ferrous    sulfate 325 milliGRAM(s) Oral daily  heparin   Injectable 5000 Unit(s) SubCutaneous every 12 hours  ibuprofen  Tablet. 600 milliGRAM(s) Oral every 6 hours  lactated ringers Bolus 1000 milliLiter(s) IV Bolus once  lactated ringers Bolus 500 milliLiter(s) IV Bolus once  lactated ringers. 1000 milliLiter(s) (75 mL/Hr) IV Continuous <Continuous>  lactated ringers. 1000 milliLiter(s) (125 mL/Hr) IV Continuous <Continuous>  morphine PF Epidural 2 milliGRAM(s) Epidural once  oxytocin Infusion 333.333 milliUNIT(s)/Min (1000 mL/Hr) IV Continuous <Continuous>  polyethylene glycol 3350 17 Gram(s) Oral daily    MEDICATIONS  (PRN):  dexAMETHasone  Injectable 4 milliGRAM(s) IV Push every 6 hours PRN Nausea  diphenhydrAMINE 25 milliGRAM(s) Oral every 6 hours PRN Pruritus  lanolin Ointment 1 Application(s) Topical every 6 hours PRN Sore Nipples  magnesium hydroxide Suspension 30 milliLiter(s) Oral two times a day PRN Constipation  nalbuphine Injectable 2.5 milliGRAM(s) IV Push every 6 hours PRN Pruritus  naloxone Injectable 0.1 milliGRAM(s) IV Push every 3 minutes PRN For ANY of the following changes in patient status:  A. Breaths Per Minute LESS THAN 10, B. Oxygen saturation LESS THAN 90%, C. Sedation score of 6 for Stop After: 4 Times  ondansetron Injectable 4 milliGRAM(s) IV Push every 6 hours PRN Nausea  oxyCODONE    IR 5 milliGRAM(s) Oral every 3 hours PRN Moderate to Severe Pain (4-10)  oxyCODONE    IR 5 milliGRAM(s) Oral once PRN Moderate to Severe Pain (4-10)  senna 2 Tablet(s) Oral every 12 hours PRN Constipation  simethicone 80 milliGRAM(s) Chew every 4 hours PRN Gas        Assessment and Plan  POD #2  s/p  section  Doing well.  Encourage ambulation.

## 2023-06-08 NOTE — DISCHARGE NOTE OB - PRINCIPAL DIAGNOSIS
30 minute individual session with the  clinician. The pt was pleasant and attention to tasks was good. Pt sat at the table with good ability and was attentive throughout all session activities. The pt followed simple verbal directions with mild cues. She imitated and spontaneously named pictures within shape sorter puzzles (I.e., animals, clothing) with good ability. Pt was often echolalic, but the SLP repeated and re-worded questions and requests for the pt when needed. Pt answered simple \"what\" questions given simple stories and picture cues with <50% accuracy. A game was introduced to target turn taking and the use of pronouns (I.e., my turn, your turn). She spontaneously said \"I do,\" and demonstrated fair ability to wait for her turn given mild cues. She showed difficulty following the directions of the game, with verbal support, as it was introduced for the first time during today's session. Eye contact was fair, as she looked at the graduate clinician during tasks without cues. Session was discussed with the pt's mother. (From previous note): Pt's mother reported that the pt was assessed for autism at the [facility] on 4/27/2023. She was diagnosed with level 2 autism. Pt is on a waiting list to be seen at home by the [facility] for treatment.      Arely Mauro   Graduate Clinician    Lauryn Martel M.A.  Speech Pathologist  6/1/2023    [id number]  speech/language tx
 delivery delivered

## 2023-06-08 NOTE — PROVIDER CONTACT NOTE (OTHER) - ASSESSMENT
patient states she has not gotten any sleep all day. patient is ambulating and OOB without any difficulties. patient is tolerating fluids and voiding without any difficulties. patient denies blurry vision, headache, dizziness, pain. vitals WNL.

## 2023-06-08 NOTE — DISCHARGE NOTE OB - NS MD DC FALL RISK RISK
For information on Fall & Injury Prevention, visit: [email or web address] OR  [email or web address] OR  https://www.cdc.gov/steadi/patient.html

## 2023-06-08 NOTE — DISCHARGE NOTE OB - MEDICATION SUMMARY - MEDICATIONS TO TAKE
I will START or STAY ON the medications listed below when I get home from the hospital:    ibuprofen 600 mg oral tablet  -- 1 tab(s) by mouth every 6 hours  -- Indication: For Anemia    acetaminophen 325 mg oral tablet  -- 3 tab(s) by mouth every 8 hours as needed for  mild pain  -- Indication: For GERD (gastroesophageal reflux disease)   I will START or STAY ON the medications listed below when I get home from the hospital:    ibuprofen 600 mg oral tablet  -- 1 tab(s) by mouth every 6 hours as needed for  moderate pain  -- Indication: For Pain    acetaminophen 325 mg oral tablet  -- 3 tab(s) by mouth every 8 hours as needed for  mild pain  -- Indication: For Pain    ferrous sulfate 325 mg (65 mg elemental iron) oral tablet  -- 1 tab(s) by mouth once a day  -- Indication: For supplement

## 2023-06-08 NOTE — DISCHARGE NOTE OB - CARE PLAN
Principal Discharge DX:	 delivery delivered  Assessment and plan of treatment:	REGULAR DIET  AMBULATION ENCOURAGED   1 Principal Discharge DX:	 delivery delivered  Assessment and plan of treatment:	After discharge, please stay on pelvic rest for 6 weeks, meaning no sexual intercourse, no tampons and no douching.  No driving for 2 weeks as women can loose a lot of blood during delivery and there is a possibility of being lightheaded/fainting.  No lifting objects heavier than baby for two weeks.  Expect to have vaginal bleeding/spotting for up to six weeks.  The bleeding should get lighter and more white/light brown with time.  For bleeding soaking more than a pad an hour or passing clots greater than the size of your fist, come in to the emergency department.    Follow up in OB office in 1-2 weeks for incision check.  Call for noticeable increase in redness or swelling at incision, discharge from incision, or opening of skin at incision site

## 2023-06-08 NOTE — DISCHARGE NOTE OB - PATIENT PORTAL LINK FT
You can access the FollowMyHealth Patient Portal offered by [facility] by registering at the following website: [email or web address]. By joining [facility]’s FollowMyHealth portal, you will also be able to view your health information using other applications (apps) compatible with our system.

## 2023-06-08 NOTE — DISCHARGE NOTE OB - LAUNCH MEDICATION RECONCILIATION
Problem: Pain  Goal: # Verbalizes understanding of pain management  Description: Documented in Patient Education Activity  Outcome: Outcome Met, Continue evaluating goal progress toward completion     Problem: Pressure Injury, Risk for  Goal: No new pressure injury (PI) development  Outcome: Outcome Met, Continue evaluating goal progress toward completion      <<-----Click here for Discharge Medication Review

## 2023-06-08 NOTE — DISCHARGE NOTE OB - CARE PROVIDER_API CALL
Ata Maldonado.  Obstetrics and Gynecology  [address] [place] [address]  Phone: [phone]  Fax: [phone]  Scheduled Appointment: 06/14/2023

## 2023-06-09 ENCOUNTER — TRANSCRIPTION ENCOUNTER (OUTPATIENT)
Age: 33
End: 2023-06-09

## 2023-06-09 VITALS
DIASTOLIC BLOOD PRESSURE: 62 MMHG | SYSTOLIC BLOOD PRESSURE: 110 MMHG | TEMPERATURE: 98 F | RESPIRATION RATE: 18 BRPM | OXYGEN SATURATION: 99 % | HEART RATE: 84 BPM

## 2023-06-09 RX ORDER — FERROUS SULFATE 325(65) MG
1 TABLET ORAL
Qty: 0 | Refills: 0 | DISCHARGE
Start: 2023-06-09

## 2023-06-09 RX ADMIN — Medication 600 MILLIGRAM(S): at 00:59

## 2023-06-09 RX ADMIN — Medication 600 MILLIGRAM(S): at 01:50

## 2023-06-09 RX ADMIN — Medication 600 MILLIGRAM(S): at 05:48

## 2023-06-09 RX ADMIN — Medication 600 MILLIGRAM(S): at 06:23

## 2023-06-09 NOTE — PROGRESS NOTE ADULT - SUBJECTIVE AND OBJECTIVE BOX
S: 33yo POD#3 s/p LTCS. The patient feels well.  Pain is well controlled. She is tolerating a regular diet and passing flatus. She is voiding spontaneously, and ambulating without difficulty. Denies CP/SOB. Denies lightheadedness/dizziness. Denies N/V.    O:  Vitals:  Vital Signs Last 24 Hrs  T(C): 36.5 (09 Jun 2023 05:59), Max: 36.9 (08 Jun 2023 13:57)  T(F): 97.7 (09 Jun 2023 05:59), Max: 98.5 (08 Jun 2023 13:57)  HR: 86 (09 Jun 2023 05:59) (86 - 101)  BP: 103/67 (09 Jun 2023 05:59) (103/67 - 107/63)  BP(mean): --  RR: 17 (09 Jun 2023 05:59) (17 - 18)  SpO2: 100% (09 Jun 2023 05:59) (99% - 100%)    Parameters below as of 09 Jun 2023 05:59  Patient On (Oxygen Delivery Method): room air        MEDICATIONS  (STANDING):  acetaminophen     Tablet .. 975 milliGRAM(s) Oral <User Schedule>  ascorbic acid 500 milliGRAM(s) Oral two times a day  diphtheria/tetanus/pertussis (acellular) Vaccine (Adacel) 0.5 milliLiter(s) IntraMuscular once  ferrous    sulfate 325 milliGRAM(s) Oral daily  heparin   Injectable 5000 Unit(s) SubCutaneous every 12 hours  ibuprofen  Tablet. 600 milliGRAM(s) Oral every 6 hours  lactated ringers. 1000 milliLiter(s) (75 mL/Hr) IV Continuous <Continuous>  polyethylene glycol 3350 17 Gram(s) Oral daily    MEDICATIONS  (PRN):  diphenhydrAMINE 25 milliGRAM(s) Oral every 6 hours PRN Pruritus  lanolin Ointment 1 Application(s) Topical every 6 hours PRN Sore Nipples  magnesium hydroxide Suspension 30 milliLiter(s) Oral two times a day PRN Constipation  oxyCODONE    IR 5 milliGRAM(s) Oral once PRN Moderate to Severe Pain (4-10)  oxyCODONE    IR 5 milliGRAM(s) Oral every 3 hours PRN Moderate to Severe Pain (4-10)  senna 2 Tablet(s) Oral every 12 hours PRN Constipation  simethicone 80 milliGRAM(s) Chew every 4 hours PRN Gas      LABS:  Blood type: O Negative  Rubella IgG: RPR: Negative                          8.8<L>   14.56<H> >-----------< 185    ( 06-07 @ 05:40 )             27.2<L>                        11.6   18.58<H> >-----------< 217    ( 06-06 @ 14:23 )             35.2    Physical exam:  Gen: NAD  Abdomen: Soft, nontender, no distension , firm uterine fundus at umbilicus.  Incision: Clean, dry, and intact with steristrips  Pelvic: Normal lochia noted  Ext: No calf tenderness    A/P: 33yo POD#3 s/p LTCS.  Patient is stable and is doing well post-operatively.  - Continue motrin, tylenol, oxycodone PRN for pain control.  - Encouraged use of abdominal binder  - Increase ambulation  - Continue regular diet  - Continue iron & vitamin C  - Discharge planning    Elsi REYNOLDS         S: 31yo POD#3 s/p LTCS. The patient feels well.  Pain is well controlled. She is tolerating a regular diet and passing flatus. She is voiding spontaneously, and ambulating without difficulty. Denies CP/SOB. Denies lightheadedness/dizziness. Denies N/V.    O:  Vitals:  Vital Signs Last 24 Hrs  T(C): 36.5 (09 Jun 2023 05:59), Max: 36.9 (08 Jun 2023 13:57)  T(F): 97.7 (09 Jun 2023 05:59), Max: 98.5 (08 Jun 2023 13:57)  HR: 86 (09 Jun 2023 05:59) (86 - 101)  BP: 103/67 (09 Jun 2023 05:59) (103/67 - 107/63)  BP(mean): --  RR: 17 (09 Jun 2023 05:59) (17 - 18)  SpO2: 100% (09 Jun 2023 05:59) (99% - 100%)    Parameters below as of 09 Jun 2023 05:59  Patient On (Oxygen Delivery Method): room air        MEDICATIONS  (STANDING):  acetaminophen     Tablet .. 975 milliGRAM(s) Oral <User Schedule>  ascorbic acid 500 milliGRAM(s) Oral two times a day  diphtheria/tetanus/pertussis (acellular) Vaccine (Adacel) 0.5 milliLiter(s) IntraMuscular once  ferrous    sulfate 325 milliGRAM(s) Oral daily  heparin   Injectable 5000 Unit(s) SubCutaneous every 12 hours  ibuprofen  Tablet. 600 milliGRAM(s) Oral every 6 hours  lactated ringers. 1000 milliLiter(s) (75 mL/Hr) IV Continuous <Continuous>  polyethylene glycol 3350 17 Gram(s) Oral daily    MEDICATIONS  (PRN):  diphenhydrAMINE 25 milliGRAM(s) Oral every 6 hours PRN Pruritus  lanolin Ointment 1 Application(s) Topical every 6 hours PRN Sore Nipples  magnesium hydroxide Suspension 30 milliLiter(s) Oral two times a day PRN Constipation  oxyCODONE    IR 5 milliGRAM(s) Oral once PRN Moderate to Severe Pain (4-10)  oxyCODONE    IR 5 milliGRAM(s) Oral every 3 hours PRN Moderate to Severe Pain (4-10)  senna 2 Tablet(s) Oral every 12 hours PRN Constipation  simethicone 80 milliGRAM(s) Chew every 4 hours PRN Gas      LABS:  Blood type: O Negative  Rubella IgG: RPR: Negative                          8.8<L>   14.56<H> >-----------< 185    ( 06-07 @ 05:40 )             27.2<L>                        11.6   18.58<H> >-----------< 217    ( 06-06 @ 14:23 )             35.2    Physical exam:  Gen: NAD  Abdomen: Soft, nontender, no distension , firm uterine fundus at umbilicus.  Incision: Clean, dry, and intact with steristrips  Pelvic: Normal lochia noted  Ext: No calf tenderness    A/P: 31yo POD#3 s/p LTCS.  Patient is stable and is doing well post-operatively.  - Continue motrin, tylenol, oxycodone PRN for pain control.  - Encouraged use of abdominal binder  - Increase ambulation  - Continue regular diet  - Continue iron & vitamin C  - Seen by  for history of anxiety & postpartum depression  - Discharge planning    Elsi REYNOLDS

## 2023-06-16 LAB — SURGICAL PATHOLOGY STUDY: SIGNIFICANT CHANGE UP

## 2023-11-29 NOTE — OB RN INTRAOPERATIVE NOTE - NS_ROOMOUT_OBGYN_ALL_OB_DT
Medication: Fluoxetine 10 mg  90 with 0 on 8/30/2023  Last office visit date: 10/12/2023  Medication Refill Protocol Failed.  Protocol approved due to: identified sig mis match, same prescription.        06-Jun-2023 11:00

## 2024-03-27 NOTE — OB PROVIDER DELIVERY SUMMARY - NSMODPPHRISK_OBGYN_A_OB
Detail Level: Generalized Detail Level: Simple Detail Level: Zone Prior  birth(s) or uterine surgery

## 2024-05-02 NOTE — OB RN PATIENT PROFILE - NS_SOURCEOFINFO_OBGYN_ALL_OB
[FreeTextEntry1] : Assessment/Plan:  #1 Globus sensation  #2 Nasal congestion  I have recommended we start nasal rinses with [facility] and flonase 2 puffs daily. The risks, benefits, and alternatives to care were discussed with the patient and understanding expressed.  May follow up with me as needed.
Patient

## 2025-08-11 ENCOUNTER — APPOINTMENT (OUTPATIENT)
Facility: CLINIC | Age: 35
End: 2025-08-11

## 2025-08-11 VITALS — DIASTOLIC BLOOD PRESSURE: 72 MMHG | SYSTOLIC BLOOD PRESSURE: 110 MMHG

## 2025-08-11 DIAGNOSIS — R35.0 FREQUENCY OF MICTURITION: ICD-10-CM

## 2025-08-11 DIAGNOSIS — N92.6 IRREGULAR MENSTRUATION, UNSPECIFIED: ICD-10-CM

## 2025-08-11 DIAGNOSIS — N84.0 POLYP OF CORPUS UTERI: ICD-10-CM

## 2025-08-11 DIAGNOSIS — Z01.411 ENCOUNTER FOR GYNECOLOGICAL EXAMINATION (GENERAL) (ROUTINE) WITH ABNORMAL FINDINGS: ICD-10-CM

## 2025-08-11 PROCEDURE — 76830 TRANSVAGINAL US NON-OB: CPT

## 2025-08-11 PROCEDURE — 76856 US EXAM PELVIC COMPLETE: CPT

## 2025-08-11 PROCEDURE — 76376 3D RENDER W/INTRP POSTPROCES: CPT

## 2025-08-11 PROCEDURE — G0444 DEPRESSION SCREEN ANNUAL: CPT | Mod: 59

## 2025-08-11 PROCEDURE — 99214 OFFICE O/P EST MOD 30 MIN: CPT | Mod: 25

## 2025-08-11 PROCEDURE — 99395 PREV VISIT EST AGE 18-39: CPT

## 2025-08-11 PROCEDURE — 99459 PELVIC EXAMINATION: CPT

## 2025-08-11 PROCEDURE — 81025 URINE PREGNANCY TEST: CPT

## 2025-08-12 RX ORDER — NAPROXEN SODIUM 550 MG/1
550 TABLET ORAL
Qty: 60 | Refills: 1 | Status: ACTIVE | COMMUNITY
Start: 2025-08-12 | End: 1900-01-01

## 2025-08-13 LAB — HPV HIGH+LOW RISK DNA PNL CVX: NOT DETECTED

## 2025-08-15 LAB — CYTOLOGY CVX/VAG DOC THIN PREP: NORMAL

## 2025-09-10 ENCOUNTER — APPOINTMENT (OUTPATIENT)
Facility: CLINIC | Age: 35
End: 2025-09-10
Payer: COMMERCIAL

## 2025-09-10 ENCOUNTER — ASOB RESULT (OUTPATIENT)
Age: 35
End: 2025-09-10

## 2025-09-10 VITALS — DIASTOLIC BLOOD PRESSURE: 69 MMHG | SYSTOLIC BLOOD PRESSURE: 114 MMHG

## 2025-09-10 LAB — HCG UR QL: NEGATIVE

## 2025-09-10 PROCEDURE — 81025 URINE PREGNANCY TEST: CPT

## 2025-09-10 PROCEDURE — 76998 US GUIDE INTRAOP: CPT | Mod: 59

## 2025-09-10 PROCEDURE — 76857 US EXAM PELVIC LIMITED: CPT

## 2025-09-10 PROCEDURE — 58558Z: CUSTOM | Mod: 52
